# Patient Record
Sex: MALE | Race: BLACK OR AFRICAN AMERICAN | NOT HISPANIC OR LATINO | Employment: OTHER | URBAN - METROPOLITAN AREA
[De-identification: names, ages, dates, MRNs, and addresses within clinical notes are randomized per-mention and may not be internally consistent; named-entity substitution may affect disease eponyms.]

---

## 2020-03-07 PROBLEM — F14.10 COCAINE ABUSE: Status: ACTIVE | Noted: 2020-03-07

## 2020-03-07 PROBLEM — R74.8 ELEVATED CPK: Status: ACTIVE | Noted: 2020-03-07

## 2020-03-07 PROBLEM — R07.89 ATYPICAL CHEST PAIN: Status: ACTIVE | Noted: 2020-03-07

## 2020-03-07 PROBLEM — N17.9 AKI (ACUTE KIDNEY INJURY): Status: ACTIVE | Noted: 2020-03-07

## 2020-03-07 PROBLEM — D69.6 THROMBOCYTOPENIA, UNSPECIFIED: Status: ACTIVE | Noted: 2020-03-07

## 2020-03-07 PROBLEM — J10.1 INFLUENZA A: Status: ACTIVE | Noted: 2020-03-07

## 2020-03-07 PROBLEM — R42 DIZZINESS: Status: ACTIVE | Noted: 2020-03-07

## 2020-03-07 PROBLEM — B00.9 HERPESVIRAL INFECTION: Status: ACTIVE | Noted: 2020-03-07

## 2020-03-07 PROBLEM — E78.00 PURE HYPERCHOLESTEROLEMIA: Status: ACTIVE | Noted: 2020-03-07

## 2020-03-07 PROBLEM — R74.8 ABNORMAL LIVER ENZYMES: Status: ACTIVE | Noted: 2020-03-07

## 2020-03-07 PROBLEM — R53.1 WEAKNESS: Status: ACTIVE | Noted: 2020-03-07

## 2020-03-07 PROBLEM — Z91.038 ALLERGY TO INSECTS AND ARACHNIDS: Status: ACTIVE | Noted: 2020-03-07

## 2020-03-07 PROBLEM — B18.2 CHRONIC VIRAL HEPATITIS C: Status: ACTIVE | Noted: 2020-03-07

## 2020-03-07 PROBLEM — K75.9 HEPATITIS: Status: ACTIVE | Noted: 2020-03-07

## 2020-03-07 PROBLEM — F32.A DEPRESSION: Status: ACTIVE | Noted: 2020-03-07

## 2020-03-08 PROBLEM — R07.9 CHEST PAIN: Status: ACTIVE | Noted: 2020-03-07

## 2020-03-08 PROBLEM — R50.9 FEVER: Status: ACTIVE | Noted: 2020-03-08

## 2020-03-10 PROBLEM — M62.82 RHABDOMYOLYSIS: Status: ACTIVE | Noted: 2020-03-10

## 2020-03-11 PROBLEM — N17.9 AKI (ACUTE KIDNEY INJURY): Status: RESOLVED | Noted: 2020-03-07 | Resolved: 2020-03-11

## 2020-03-11 PROBLEM — R42 DIZZINESS: Status: RESOLVED | Noted: 2020-03-07 | Resolved: 2020-03-11

## 2020-03-11 PROBLEM — R07.9 CHEST PAIN: Status: RESOLVED | Noted: 2020-03-07 | Resolved: 2020-03-11

## 2020-03-11 PROBLEM — M62.82 RHABDOMYOLYSIS: Status: RESOLVED | Noted: 2020-03-10 | Resolved: 2020-03-11

## 2020-03-11 PROBLEM — R50.9 FEVER: Status: RESOLVED | Noted: 2020-03-08 | Resolved: 2020-03-11

## 2020-03-11 PROBLEM — R07.9 CHEST PAIN: Status: ACTIVE | Noted: 2020-03-11

## 2020-09-15 PROBLEM — D69.6 THROMBOCYTOPENIA: Chronic | Status: ACTIVE | Noted: 2020-09-15

## 2020-09-15 PROBLEM — D69.6 THROMBOCYTOPENIA: Status: ACTIVE | Noted: 2020-09-15

## 2020-09-15 PROBLEM — U07.1 RESPIRATORY TRACT INFECTION DUE TO COVID-19 VIRUS: Status: ACTIVE | Noted: 2020-09-15

## 2020-09-15 PROBLEM — J98.8 RESPIRATORY TRACT INFECTION DUE TO COVID-19 VIRUS: Status: ACTIVE | Noted: 2020-09-15

## 2020-09-15 PROBLEM — E87.20 METABOLIC ACIDOSIS: Status: ACTIVE | Noted: 2020-09-15

## 2020-09-15 PROBLEM — J12.82 PNEUMONIA DUE TO COVID-19 VIRUS: Status: ACTIVE | Noted: 2020-09-15

## 2020-09-15 PROBLEM — E87.1 HYPONATREMIA: Status: ACTIVE | Noted: 2020-09-15

## 2020-09-15 PROBLEM — J96.01 ACUTE RESPIRATORY FAILURE WITH HYPOXIA: Status: ACTIVE | Noted: 2020-09-15

## 2020-09-16 PROBLEM — E55.9 VITAMIN D INSUFFICIENCY: Status: ACTIVE | Noted: 2020-09-16

## 2020-09-18 DIAGNOSIS — U07.1 COVID-19 VIRUS DETECTED: ICD-10-CM

## 2020-09-19 PROBLEM — K72.91 HEPATIC COMA/ENCEPHALOPATHY: Status: RESOLVED | Noted: 2020-09-19 | Resolved: 2020-09-19

## 2020-09-19 PROBLEM — U07.1 COVID-19: Status: ACTIVE | Noted: 2020-09-19

## 2020-09-19 PROBLEM — E11.9 TYPE 2 DIABETES MELLITUS WITHOUT COMPLICATION, WITHOUT LONG-TERM CURRENT USE OF INSULIN: Status: ACTIVE | Noted: 2020-09-19

## 2020-09-19 PROBLEM — K72.91 HEPATIC COMA/ENCEPHALOPATHY: Status: ACTIVE | Noted: 2020-09-19

## 2020-09-20 PROBLEM — Z71.89 GRIEF COUNSELING: Status: ACTIVE | Noted: 2020-09-20

## 2020-09-21 PROBLEM — U07.1 PNEUMONIA DUE TO COVID-19 VIRUS: Status: RESOLVED | Noted: 2020-09-15 | Resolved: 2020-09-21

## 2020-09-21 PROBLEM — Z71.89 GRIEF COUNSELING: Status: RESOLVED | Noted: 2020-09-20 | Resolved: 2020-09-21

## 2020-09-21 PROBLEM — J12.82 PNEUMONIA DUE TO COVID-19 VIRUS: Status: RESOLVED | Noted: 2020-09-15 | Resolved: 2020-09-21

## 2021-06-06 PROBLEM — R07.9 CHEST PAIN: Status: ACTIVE | Noted: 2021-06-06

## 2021-06-06 PROBLEM — F10.10 ALCOHOL ABUSE: Status: ACTIVE | Noted: 2021-06-06

## 2021-07-10 PROBLEM — N17.9 AKI (ACUTE KIDNEY INJURY): Status: ACTIVE | Noted: 2021-07-10

## 2021-07-10 PROBLEM — R07.89 ATYPICAL CHEST PAIN: Status: ACTIVE | Noted: 2021-06-06

## 2021-07-13 PROBLEM — F14.129 COCAINE ABUSE WITH INTOXICATION: Status: ACTIVE | Noted: 2020-03-07

## 2021-07-13 PROBLEM — M10.9 GOUT: Status: ACTIVE | Noted: 2021-07-13

## 2021-07-13 PROBLEM — N17.9 AKI (ACUTE KIDNEY INJURY): Status: RESOLVED | Noted: 2021-07-10 | Resolved: 2021-07-13

## 2021-07-15 PROBLEM — R74.8 ELEVATED CPK: Status: RESOLVED | Noted: 2020-03-07 | Resolved: 2021-07-15

## 2021-07-15 PROBLEM — R07.89 ATYPICAL CHEST PAIN: Status: RESOLVED | Noted: 2021-06-06 | Resolved: 2021-07-15

## 2021-07-15 PROBLEM — E11.9 TYPE 2 DIABETES MELLITUS WITHOUT COMPLICATION, WITHOUT LONG-TERM CURRENT USE OF INSULIN: Status: RESOLVED | Noted: 2020-09-19 | Resolved: 2021-07-15

## 2022-01-31 PROBLEM — R53.1 WEAKNESS: Status: RESOLVED | Noted: 2020-03-07 | Resolved: 2022-01-31

## 2022-01-31 PROBLEM — E55.9 VITAMIN D INSUFFICIENCY: Chronic | Status: ACTIVE | Noted: 2020-09-16

## 2022-01-31 PROBLEM — B18.2 CHRONIC VIRAL HEPATITIS C: Chronic | Status: ACTIVE | Noted: 2020-03-07

## 2022-01-31 PROBLEM — J10.1 INFLUENZA A: Status: RESOLVED | Noted: 2020-03-07 | Resolved: 2022-01-31

## 2022-01-31 PROBLEM — U07.1 COVID-19: Status: RESOLVED | Noted: 2020-09-19 | Resolved: 2022-01-31

## 2022-01-31 PROBLEM — J96.01 ACUTE RESPIRATORY FAILURE WITH HYPOXIA: Status: RESOLVED | Noted: 2020-09-15 | Resolved: 2022-01-31

## 2022-01-31 PROBLEM — K75.9 HEPATITIS: Status: RESOLVED | Noted: 2020-03-07 | Resolved: 2022-01-31

## 2022-01-31 PROBLEM — E78.00 PURE HYPERCHOLESTEROLEMIA: Chronic | Status: ACTIVE | Noted: 2020-03-07

## 2022-01-31 PROBLEM — B00.9 HERPESVIRAL INFECTION: Chronic | Status: ACTIVE | Noted: 2020-03-07

## 2022-01-31 PROBLEM — Z91.038 ALLERGY TO INSECTS AND ARACHNIDS: Status: RESOLVED | Noted: 2020-03-07 | Resolved: 2022-01-31

## 2022-01-31 PROBLEM — M10.9 GOUT: Chronic | Status: ACTIVE | Noted: 2021-07-13

## 2022-01-31 PROBLEM — E87.20 METABOLIC ACIDOSIS: Status: RESOLVED | Noted: 2020-09-15 | Resolved: 2022-01-31

## 2022-01-31 PROBLEM — F14.129 COCAINE ABUSE WITH INTOXICATION: Status: RESOLVED | Noted: 2020-03-07 | Resolved: 2022-01-31

## 2022-01-31 PROBLEM — F32.A DEPRESSION: Chronic | Status: ACTIVE | Noted: 2020-03-07

## 2022-01-31 PROBLEM — E87.1 HYPONATREMIA: Status: RESOLVED | Noted: 2020-09-15 | Resolved: 2022-01-31

## 2022-01-31 PROBLEM — F10.10 ALCOHOL ABUSE: Chronic | Status: ACTIVE | Noted: 2021-06-06

## 2022-02-02 PROBLEM — Z72.0 TOBACCO ABUSE: Status: ACTIVE | Noted: 2022-02-02

## 2022-02-04 PROBLEM — T24.219A BLISTERS WITH EPIDERMAL LOSS DUE TO BURN (SECOND DEGREE) OF THIGH (ANY PART): Status: ACTIVE | Noted: 2022-02-04

## 2022-02-05 PROBLEM — R07.9 CHEST PAIN: Status: RESOLVED | Noted: 2020-03-07 | Resolved: 2022-02-05

## 2022-07-16 PROBLEM — F17.210 CIGARETTE NICOTINE DEPENDENCE WITHOUT COMPLICATION: Status: ACTIVE | Noted: 2022-02-02

## 2022-07-16 PROBLEM — Z79.4 TYPE 2 DIABETES MELLITUS WITHOUT COMPLICATION, WITH LONG-TERM CURRENT USE OF INSULIN: Status: ACTIVE | Noted: 2020-09-19

## 2022-07-16 PROBLEM — F10.20 ALCOHOL USE DISORDER, MODERATE, DEPENDENCE: Status: ACTIVE | Noted: 2022-07-16

## 2022-07-16 PROBLEM — F19.94 SUBSTANCE INDUCED MOOD DISORDER: Status: ACTIVE | Noted: 2022-07-16

## 2022-07-16 PROBLEM — F32.9 MAJOR DEPRESSIVE DISORDER: Status: ACTIVE | Noted: 2020-03-07

## 2022-07-16 PROBLEM — F14.20 COCAINE USE DISORDER, SEVERE, DEPENDENCE: Status: ACTIVE | Noted: 2022-07-16

## 2022-07-16 PROBLEM — F10.20 ALCOHOL USE DISORDER, SEVERE, DEPENDENCE: Status: ACTIVE | Noted: 2022-07-16

## 2022-07-18 PROBLEM — F33.2 SEVERE EPISODE OF RECURRENT MAJOR DEPRESSIVE DISORDER, WITHOUT PSYCHOTIC FEATURES: Status: ACTIVE | Noted: 2022-07-18

## 2022-07-20 PROBLEM — I95.2 HYPOTENSION DUE TO DRUGS: Status: ACTIVE | Noted: 2022-07-20

## 2022-08-06 PROBLEM — F14.10 COCAINE USE DISORDER: Status: ACTIVE | Noted: 2022-08-06

## 2022-10-03 PROBLEM — R06.02 SHORTNESS OF BREATH: Status: RESOLVED | Noted: 2022-10-03 | Resolved: 2022-10-03

## 2022-10-03 PROBLEM — R45.851 DEPRESSION WITH SUICIDAL IDEATION: Status: ACTIVE | Noted: 2020-03-07

## 2022-10-03 PROBLEM — R06.02 SHORTNESS OF BREATH: Status: ACTIVE | Noted: 2022-10-03

## 2022-10-03 PROBLEM — E11.9 TYPE 2 DIABETES MELLITUS WITHOUT COMPLICATION, WITHOUT LONG-TERM CURRENT USE OF INSULIN: Status: RESOLVED | Noted: 2020-09-19 | Resolved: 2022-10-03

## 2022-10-03 PROBLEM — R79.89 ELEVATED LFTS: Status: ACTIVE | Noted: 2022-10-03

## 2022-10-03 PROBLEM — Z79.4 TYPE 2 DIABETES MELLITUS WITHOUT COMPLICATION, WITH LONG-TERM CURRENT USE OF INSULIN: Status: RESOLVED | Noted: 2020-09-19 | Resolved: 2022-10-03

## 2022-10-03 PROBLEM — R74.01 TRANSAMINITIS: Status: ACTIVE | Noted: 2022-10-03

## 2022-10-03 PROBLEM — E88.09 HYPOALBUMINEMIA: Status: ACTIVE | Noted: 2022-10-03

## 2022-10-05 PROBLEM — F33.3 SEVERE EPISODE OF RECURRENT MAJOR DEPRESSIVE DISORDER, WITH PSYCHOTIC FEATURES: Status: ACTIVE | Noted: 2022-10-05

## 2023-03-08 ENCOUNTER — PATIENT OUTREACH (OUTPATIENT)
Dept: ADMINISTRATIVE | Facility: HOSPITAL | Age: 63
End: 2023-03-08

## 2023-05-22 PROBLEM — Z72.0 TOBACCO ABUSE: Status: ACTIVE | Noted: 2023-05-22

## 2023-05-23 PROBLEM — T30.0 BURN INJURY: Status: ACTIVE | Noted: 2023-05-23

## 2023-06-18 PROBLEM — N49.2 SCROTAL INFECTION: Status: ACTIVE | Noted: 2023-06-18

## 2023-06-19 PROBLEM — N49.2 SCROTAL INFECTION: Status: ACTIVE | Noted: 2023-06-19

## 2023-06-19 PROBLEM — L30.4 INTERTRIGO: Status: ACTIVE | Noted: 2023-06-18

## 2023-09-30 PROBLEM — M25.551 ACUTE RIGHT HIP PAIN: Status: ACTIVE | Noted: 2023-09-30

## 2023-09-30 PROBLEM — M25.552 ACUTE HIP PAIN, LEFT: Status: ACTIVE | Noted: 2023-09-30

## 2023-10-02 PROBLEM — R79.89 ABNORMAL LFTS: Status: RESOLVED | Noted: 2022-10-03 | Resolved: 2023-10-02

## 2023-10-04 PROBLEM — R45.851 SUICIDAL IDEATION: Status: ACTIVE | Noted: 2023-10-04

## 2023-10-04 PROBLEM — Z00.8 MEDICAL CLEARANCE FOR PSYCHIATRIC ADMISSION: Status: ACTIVE | Noted: 2023-10-04

## 2023-10-04 PROBLEM — I95.2 HYPOTENSION DUE TO DRUGS: Status: RESOLVED | Noted: 2022-07-20 | Resolved: 2023-10-04

## 2023-10-21 PROBLEM — R73.03 PREDIABETES: Status: ACTIVE | Noted: 2023-10-21

## 2023-12-29 PROBLEM — F33.1 MODERATE EPISODE OF RECURRENT MAJOR DEPRESSIVE DISORDER: Status: ACTIVE | Noted: 2023-12-29

## 2023-12-30 PROBLEM — F43.20 GRIEF REACTION: Status: ACTIVE | Noted: 2023-12-30

## 2023-12-30 PROBLEM — F43.21 GRIEF REACTION: Status: ACTIVE | Noted: 2023-12-30

## 2024-01-08 PROBLEM — Z00.8 MEDICAL CLEARANCE FOR PSYCHIATRIC ADMISSION: Status: RESOLVED | Noted: 2023-10-04 | Resolved: 2024-01-08

## 2024-04-27 PROBLEM — R45.851 DEPRESSION WITH SUICIDAL IDEATION: Chronic | Status: ACTIVE | Noted: 2020-03-07

## 2024-04-27 PROBLEM — Z72.0 TOBACCO ABUSE: Chronic | Status: ACTIVE | Noted: 2023-05-22

## 2024-04-27 PROBLEM — R45.851 DEPRESSION WITH SUICIDAL IDEATION: Chronic | Status: RESOLVED | Noted: 2020-03-07 | Resolved: 2024-04-27

## 2024-04-27 PROBLEM — T24.219A BLISTERS WITH EPIDERMAL LOSS DUE TO BURN (SECOND DEGREE) OF THIGH (ANY PART): Status: RESOLVED | Noted: 2022-02-04 | Resolved: 2024-04-27

## 2024-04-27 PROBLEM — R45.851 SUICIDAL IDEATION: Status: RESOLVED | Noted: 2023-10-04 | Resolved: 2024-04-27

## 2024-04-27 PROBLEM — F19.94 SUBSTANCE INDUCED MOOD DISORDER: Chronic | Status: ACTIVE | Noted: 2022-07-16

## 2024-04-27 PROBLEM — F10.20 ALCOHOL USE DISORDER, MODERATE, DEPENDENCE: Chronic | Status: ACTIVE | Noted: 2022-07-16

## 2024-04-27 PROBLEM — F14.20 COCAINE USE DISORDER, SEVERE, DEPENDENCE: Chronic | Status: ACTIVE | Noted: 2022-07-16

## 2024-04-27 PROBLEM — F10.10 ALCOHOL ABUSE: Status: RESOLVED | Noted: 2021-06-06 | Resolved: 2024-04-27

## 2024-04-27 PROBLEM — F33.3 SEVERE EPISODE OF RECURRENT MAJOR DEPRESSIVE DISORDER, WITH PSYCHOTIC FEATURES: Status: RESOLVED | Noted: 2022-10-05 | Resolved: 2024-04-27

## 2024-04-27 PROBLEM — F32.A DEPRESSION WITH SUICIDAL IDEATION: Chronic | Status: RESOLVED | Noted: 2020-03-07 | Resolved: 2024-04-27

## 2024-04-27 PROBLEM — F33.3 SEVERE EPISODE OF RECURRENT MAJOR DEPRESSIVE DISORDER, WITH PSYCHOTIC FEATURES: Chronic | Status: ACTIVE | Noted: 2022-10-05

## 2024-04-27 PROBLEM — F43.21 GRIEF REACTION: Status: RESOLVED | Noted: 2023-12-30 | Resolved: 2024-04-27

## 2024-04-27 PROBLEM — F43.20 GRIEF REACTION: Status: RESOLVED | Noted: 2023-12-30 | Resolved: 2024-04-27

## 2024-04-27 PROBLEM — F33.1 MODERATE EPISODE OF RECURRENT MAJOR DEPRESSIVE DISORDER: Chronic | Status: RESOLVED | Noted: 2023-12-29 | Resolved: 2024-04-27

## 2024-04-27 PROBLEM — L30.4 INTERTRIGO: Chronic | Status: ACTIVE | Noted: 2023-06-18

## 2024-04-27 PROBLEM — F33.2 SEVERE EPISODE OF RECURRENT MAJOR DEPRESSIVE DISORDER, WITHOUT PSYCHOTIC FEATURES: Chronic | Status: ACTIVE | Noted: 2022-07-18

## 2024-04-27 PROBLEM — F17.210 CIGARETTE NICOTINE DEPENDENCE WITHOUT COMPLICATION: Status: RESOLVED | Noted: 2022-02-02 | Resolved: 2024-04-27

## 2024-04-27 PROBLEM — E11.9 TYPE 2 DIABETES MELLITUS WITHOUT COMPLICATION, WITHOUT LONG-TERM CURRENT USE OF INSULIN: Chronic | Status: ACTIVE | Noted: 2020-09-19

## 2024-04-27 PROBLEM — F33.1 MODERATE EPISODE OF RECURRENT MAJOR DEPRESSIVE DISORDER: Chronic | Status: ACTIVE | Noted: 2023-12-29

## 2024-04-27 PROBLEM — F14.10 COCAINE USE DISORDER: Status: RESOLVED | Noted: 2022-08-06 | Resolved: 2024-04-27

## 2024-04-27 PROBLEM — M25.551 RIGHT HIP PAIN: Status: RESOLVED | Noted: 2023-09-30 | Resolved: 2024-04-27

## 2024-04-27 PROBLEM — R07.89 OTHER CHEST PAIN: Status: RESOLVED | Noted: 2020-03-07 | Resolved: 2024-04-27

## 2024-04-27 PROBLEM — N49.2 SCROTAL INFECTION: Status: RESOLVED | Noted: 2023-06-19 | Resolved: 2024-04-27

## 2024-04-27 PROBLEM — N17.9 AKI (ACUTE KIDNEY INJURY): Status: RESOLVED | Noted: 2021-07-10 | Resolved: 2024-04-27

## 2024-04-27 PROBLEM — R73.03 PREDIABETES: Status: RESOLVED | Noted: 2023-10-21 | Resolved: 2024-04-27

## 2024-04-27 PROBLEM — T30.0 BURN INJURY: Status: RESOLVED | Noted: 2023-05-23 | Resolved: 2024-04-27

## 2024-04-29 ENCOUNTER — PATIENT OUTREACH (OUTPATIENT)
Dept: ADMINISTRATIVE | Facility: HOSPITAL | Age: 64
End: 2024-04-29

## 2024-04-29 DIAGNOSIS — E11.9 TYPE 2 DIABETES MELLITUS WITHOUT COMPLICATION, WITHOUT LONG-TERM CURRENT USE OF INSULIN: Primary | Chronic | ICD-10-CM

## 2024-07-24 ENCOUNTER — HOSPITAL ENCOUNTER (EMERGENCY)
Facility: HOSPITAL | Age: 64
Discharge: HOME | End: 2024-07-24
Attending: STUDENT IN AN ORGANIZED HEALTH CARE EDUCATION/TRAINING PROGRAM
Payer: MEDICAID

## 2024-07-24 VITALS
BODY MASS INDEX: 31.84 KG/M2 | WEIGHT: 215 LBS | SYSTOLIC BLOOD PRESSURE: 168 MMHG | HEART RATE: 63 BPM | RESPIRATION RATE: 18 BRPM | DIASTOLIC BLOOD PRESSURE: 78 MMHG | OXYGEN SATURATION: 98 % | HEIGHT: 69 IN | TEMPERATURE: 98 F

## 2024-07-24 DIAGNOSIS — R73.9 HYPERGLYCEMIA: ICD-10-CM

## 2024-07-24 DIAGNOSIS — R06.02 SHORTNESS OF BREATH: Primary | ICD-10-CM

## 2024-07-24 LAB
ALBUMIN SERPL-MCNC: 3.7 G/DL (ref 3.4–4.8)
ALBUMIN/GLOB SERPL: 0.9 RATIO (ref 1.1–2)
ALP SERPL-CCNC: 181 UNIT/L (ref 40–150)
ALT SERPL-CCNC: 38 UNIT/L (ref 0–55)
ANION GAP SERPL CALC-SCNC: 11 MEQ/L
AST SERPL-CCNC: 29 UNIT/L (ref 5–34)
BASOPHILS # BLD AUTO: 0.04 X10(3)/MCL
BASOPHILS NFR BLD AUTO: 0.4 %
BILIRUB SERPL-MCNC: 0.3 MG/DL
BNP BLD-MCNC: 12.2 PG/ML
BUN SERPL-MCNC: 23.3 MG/DL (ref 8.4–25.7)
CALCIUM SERPL-MCNC: 9.6 MG/DL (ref 8.8–10)
CHLORIDE SERPL-SCNC: 101 MMOL/L (ref 98–107)
CO2 SERPL-SCNC: 23 MMOL/L (ref 23–31)
CREAT SERPL-MCNC: 1.36 MG/DL (ref 0.73–1.18)
CREAT/UREA NIT SERPL: 17
EOSINOPHIL # BLD AUTO: 0.27 X10(3)/MCL (ref 0–0.9)
EOSINOPHIL NFR BLD AUTO: 2.9 %
ERYTHROCYTE [DISTWIDTH] IN BLOOD BY AUTOMATED COUNT: 18.2 % (ref 11.5–17)
GFR SERPLBLD CREATININE-BSD FMLA CKD-EPI: 58 ML/MIN/1.73/M2
GLOBULIN SER-MCNC: 4.2 GM/DL (ref 2.4–3.5)
GLUCOSE SERPL-MCNC: 481 MG/DL (ref 82–115)
HCT VFR BLD AUTO: 38.6 % (ref 42–52)
HGB BLD-MCNC: 12.6 G/DL (ref 14–18)
HOLD SPECIMEN: NORMAL
IMM GRANULOCYTES # BLD AUTO: 0.03 X10(3)/MCL (ref 0–0.04)
IMM GRANULOCYTES NFR BLD AUTO: 0.3 %
LYMPHOCYTES # BLD AUTO: 2.46 X10(3)/MCL (ref 0.6–4.6)
LYMPHOCYTES NFR BLD AUTO: 26.2 %
MAGNESIUM SERPL-MCNC: 2.1 MG/DL (ref 1.6–2.6)
MCH RBC QN AUTO: 24.3 PG (ref 27–31)
MCHC RBC AUTO-ENTMCNC: 32.6 G/DL (ref 33–36)
MCV RBC AUTO: 74.5 FL (ref 80–94)
MONOCYTES # BLD AUTO: 0.93 X10(3)/MCL (ref 0.1–1.3)
MONOCYTES NFR BLD AUTO: 9.9 %
NEUTROPHILS # BLD AUTO: 5.65 X10(3)/MCL (ref 2.1–9.2)
NEUTROPHILS NFR BLD AUTO: 60.3 %
NRBC BLD AUTO-RTO: 0 %
PLATELET # BLD AUTO: 169 X10(3)/MCL (ref 130–400)
PLATELETS.RETICULATED NFR BLD AUTO: 8.2 % (ref 0.9–11.2)
PMV BLD AUTO: ABNORMAL FL
POCT GLUCOSE: 372 MG/DL (ref 70–110)
POTASSIUM SERPL-SCNC: 4.8 MMOL/L (ref 3.5–5.1)
PROT SERPL-MCNC: 7.9 GM/DL (ref 5.8–7.6)
RBC # BLD AUTO: 5.18 X10(6)/MCL (ref 4.7–6.1)
SODIUM SERPL-SCNC: 135 MMOL/L (ref 136–145)
TROPONIN I SERPL-MCNC: <0.01 NG/ML (ref 0–0.04)
WBC # BLD AUTO: 9.38 X10(3)/MCL (ref 4.5–11.5)

## 2024-07-24 PROCEDURE — 83880 ASSAY OF NATRIURETIC PEPTIDE: CPT | Performed by: STUDENT IN AN ORGANIZED HEALTH CARE EDUCATION/TRAINING PROGRAM

## 2024-07-24 PROCEDURE — 25000003 PHARM REV CODE 250: Performed by: STUDENT IN AN ORGANIZED HEALTH CARE EDUCATION/TRAINING PROGRAM

## 2024-07-24 PROCEDURE — 99285 EMERGENCY DEPT VISIT HI MDM: CPT | Mod: 25

## 2024-07-24 PROCEDURE — 82962 GLUCOSE BLOOD TEST: CPT

## 2024-07-24 PROCEDURE — 93005 ELECTROCARDIOGRAM TRACING: CPT

## 2024-07-24 PROCEDURE — 96360 HYDRATION IV INFUSION INIT: CPT

## 2024-07-24 PROCEDURE — 84484 ASSAY OF TROPONIN QUANT: CPT | Performed by: STUDENT IN AN ORGANIZED HEALTH CARE EDUCATION/TRAINING PROGRAM

## 2024-07-24 PROCEDURE — 85025 COMPLETE CBC W/AUTO DIFF WBC: CPT | Performed by: STUDENT IN AN ORGANIZED HEALTH CARE EDUCATION/TRAINING PROGRAM

## 2024-07-24 PROCEDURE — 83735 ASSAY OF MAGNESIUM: CPT | Performed by: STUDENT IN AN ORGANIZED HEALTH CARE EDUCATION/TRAINING PROGRAM

## 2024-07-24 PROCEDURE — 80053 COMPREHEN METABOLIC PANEL: CPT | Performed by: STUDENT IN AN ORGANIZED HEALTH CARE EDUCATION/TRAINING PROGRAM

## 2024-07-24 RX ADMIN — SODIUM CHLORIDE 1000 ML: 9 INJECTION, SOLUTION INTRAVENOUS at 03:07

## 2024-07-24 NOTE — ED PROVIDER NOTES
Encounter Date: 7/24/2024       History     Chief Complaint   Patient presents with    Shortness of Breath     SOB since this morning. Denies pain. No distress. Sent from [facility] substance abuse program.      Patient presents to the emergency department due to shortness of breath.  He was states he was symptoms started this morning.  Intermittent sharp anterior chest pain but nothing consistent.  Denies any cough or congestion.  He was given a breathing treatment at the substance abuse treatment facility at which he is currently inpatient with significant improvement.  No fevers.  No pain or swelling in his legs.    The history is provided by the patient.     Review of patient's allergies indicates:  No Known Allergies  Past Medical History:   Diagnosis Date    Acute respiratory failure with hypoxia 09/15/2020    Alcohol abuse 06/06/2021    Alcohol use disorder, moderate, dependence 07/16/2022    Arthritis     Back pain     Chronic viral hepatitis C 03/07/2020    Cocaine abuse     Cocaine abuse with intoxication 03/07/2020    Cocaine use disorder, severe, dependence 07/16/2022    COVID-19 09/19/2020    Depression 03/07/2020    Depression with suicidal ideation 03/07/2020    Diabetes mellitus     Essential hypertension 01/14/2016    Gout 07/13/2021    Hearing deficit     Hepatitis C     Herpesviral infection 03/07/2020    History of psychiatric hospitalization     HTN (hypertension)     Hx of psychiatric care     Pure hypercholesterolemia 03/07/2020    Pure hypercholesterolemia 03/07/2020    Scrotal infection 06/19/2023    Severe episode of recurrent major depressive disorder, without psychotic features 07/18/2022    Substance induced mood disorder 07/16/2022    Suicidal ideation 10/04/2023    Type 2 diabetes mellitus without complication, without long-term current use of insulin 09/19/2020    Uncontrolled type 2 diabetes mellitus with hyperglycemia     Vitamin D insufficiency 09/16/2020     Past Surgical  "History:   Procedure Laterality Date    ARTHROCENTESIS OF HIP JOINT Right 9/30/2023    Procedure: RIGHT HIP ASPIRATION, POS I&D;  Surgeon: Susan Houston MD;  Location: [facility] MAIN OR;  Service: Orthopedics;  Laterality: Right;    NO PAST SURGERIES       Family History   Problem Relation Name Age of Onset    No Known Problems Mother      Heart disease Father      Heart disease Brother       Social History     Tobacco Use    Smoking status: Every Day     Current packs/day: 0.25     Average packs/day: 0.3 packs/day for 12.0 years (3.0 ttl pk-yrs)     Types: Cigarettes    Smokeless tobacco: Never   Substance Use Topics    Alcohol use: Yes     Comment: Patient drinks 2-3 times/week. Each time he drinks 3-4 cans of beer.    Drug use: Yes     Types: Cocaine, "Crack" cocaine     Review of Systems   Constitutional:  Negative for chills and fever.   HENT:  Negative for congestion and sore throat.    Respiratory:  Positive for shortness of breath. Negative for cough.    Cardiovascular:  Positive for chest pain. Negative for palpitations.   Gastrointestinal:  Negative for abdominal pain and nausea.   Genitourinary:  Negative for dysuria and hematuria.   Musculoskeletal:  Negative for arthralgias and myalgias.   Neurological:  Negative for dizziness and weakness.       Physical Exam     Initial Vitals [07/24/24 1414]   BP Pulse Resp Temp SpO2   (!) 145/84 68 18 97.8 °F (36.6 °C) 97 %      MAP       --         Physical Exam    Nursing note and vitals reviewed.  Constitutional: He appears well-developed and well-nourished.   HENT:   Head: Normocephalic and atraumatic.   Eyes: Conjunctivae are normal. Pupils are equal, round, and reactive to light.   Neck: Neck supple.   Normal range of motion.  Cardiovascular:  Normal rate, regular rhythm and normal heart sounds.           Pulmonary/Chest: Breath sounds normal. No respiratory distress. He has no wheezes. He has no rales.   Abdominal: Abdomen is soft. There is no abdominal " tenderness.   Musculoskeletal:         General: No edema. Normal range of motion.      Cervical back: Normal range of motion and neck supple.     Neurological: He is alert and oriented to person, place, and time.   Skin: Skin is warm and dry.         ED Course   Procedures  Labs Reviewed   COMPREHENSIVE METABOLIC PANEL - Abnormal       Result Value    Sodium 135 (*)     Potassium 4.8      Chloride 101      CO2 23      Glucose 481 (*)     Blood Urea Nitrogen 23.3      Creatinine 1.36 (*)     Calcium 9.6      Protein Total 7.9 (*)     Albumin 3.7      Globulin 4.2 (*)     Albumin/Globulin Ratio 0.9 (*)     Bilirubin Total 0.3       (*)     ALT 38      AST 29      eGFR 58      Anion Gap 11.0      BUN/Creatinine Ratio 17     CBC WITH DIFFERENTIAL - Abnormal    WBC 9.38      RBC 5.18      Hgb 12.6 (*)     Hct 38.6 (*)     MCV 74.5 (*)     MCH 24.3 (*)     MCHC 32.6 (*)     RDW 18.2 (*)     Platelet 169      MPV        IPF 8.2      Neut % 60.3      Lymph % 26.2      Mono % 9.9      Eos % 2.9      Basophil % 0.4      Lymph # 2.46      Neut # 5.65      Mono # 0.93      Eos # 0.27      Baso # 0.04      IG# 0.03      IG% 0.3      NRBC% 0.0     POCT GLUCOSE - Abnormal    POCT Glucose 372 (*)    MAGNESIUM - Normal    Magnesium Level 2.10     TROPONIN I - Normal    Troponin-I <0.010     B-TYPE NATRIURETIC PEPTIDE - Normal    Natriuretic Peptide 12.2     CBC W/ AUTO DIFFERENTIAL    Narrative:     The following orders were created for panel order CBC auto differential.  Procedure                               Abnormality         Status                     ---------                               -----------         ------                     CBC with Differential[[id number]]       Abnormal            Final result                 Please view results for these tests on the individual orders.   EXTRA TUBES    Narrative:     The following orders were created for panel order EXTRA TUBES.  Procedure                                Abnormality         Status                     ---------                               -----------         ------                     Light Blue Top Hold[[id number]]                             Final result               Red Top Hold[[id number]]                                    Final result               Light Green Top Hold[[id number]]                            Final result               Lavender Top Hold[[id number]]                               Final result               Gold Top Hold[[id number]]                                   Final result               Pink Top Hold[[id number]]                                   Final result                 Please view results for these tests on the individual orders.   LIGHT BLUE TOP HOLD    Extra Tube Hold for add-ons.     RED TOP HOLD    Extra Tube Hold for add-ons.     LIGHT GREEN TOP HOLD    Extra Tube Hold for add-ons.     LAVENDER TOP HOLD    Extra Tube Hold for add-ons.     GOLD TOP HOLD    Extra Tube Hold for add-ons.     PINK TOP HOLD    Extra Tube Hold for add-ons.          ECG Results              EKG 12-lead (In process)        Collection Time Result Time QRS Duration [facility] QTC Calculation    07/24/24 14:26:34 07/24/24 14:37:32 86 425                     In process by Interface, Lab In [facility] (07/24/24 14:37:42)                   Narrative:    Test Reason : R06.02,    Vent. Rate : 059 BPM     Atrial Rate : 059 BPM     P-R Int : 210 ms          QRS Dur : 086 ms      QT Int : 430 ms       P-R-T Axes : 043 -07 017 degrees     QTc Int : 425 ms    Sinus bradycardia with 1st degree A-V block  Moderate voltage criteria for LVH, may be normal variant  Borderline Abnormal ECG  When compared with ECG of 12-JUN-2024 10:09,  No significant change was found    Referred By: [email or web address]   SELF           Confirmed By:                                   Imaging Results              X-Ray Chest AP Portable (Final result)  Result time 07/24/24 16:31:47      Final result by Nnamdi  Jaquan WILKERSON MD (07/24/24 16:31:47)                   Impression:      No acute findings.      Electronically signed by: Jaquan Coto  Date:    07/24/2024  Time:    16:31               Narrative:    EXAMINATION:  XR CHEST AP PORTABLE    CLINICAL HISTORY:  Shortness of breath;    COMPARISON:  12 June 2024    FINDINGS:  Frontal view of the chest was obtained. Heart is not significantly enlarged.  Grossly clear lungs.  No pneumothorax.                                       Medications   sodium chloride 0.9% bolus 1,000 mL 1,000 mL (0 mLs Intravenous Stopped 7/24/24 1651)     Medical Decision Making  Mildly hypertensive otherwise vital signs are stable.  EKGs without acute ischemic changes.  CBC, troponin reassuring.  Chest x-ray without acute approcess.  He was significantly hyperglycemic on CMP but no evidence of DKA.  After IV fluids glucose has been improved.  Otherwise workup reassuring.  Appropriate for discharge.     Amount and/or Complexity of Data Reviewed  Labs: ordered. Decision-making details documented in ED Course.  Radiology: ordered. Decision-making details documented in ED Course.  ECG/medicine tests: ordered and independent interpretation performed. Decision-making details documented in ED Course.      Additional MDM:   Differential Diagnosis:   Pneumonia, Asthma exacerbation, COPD exacerbation, Pericardial Effusion, Hear Failure, Pulmonary Emboli, Pleural effusion, malignancy, among others                                     Clinical Impression:  Final diagnoses:  [R06.02] Shortness of breath (Primary)  [R73.9] Hyperglycemia          ED Disposition Condition    Discharge Stable          ED Prescriptions    None       Follow-up Information       Follow up With Specialties Details Why Contact Info    [facility] Emergency Medicine Go to  If symptoms worsen [address] [facility] [place] [address] [phone]             Tyler Parker MD  07/25/24 1973

## 2024-07-25 LAB
OHS QRS DURATION: 86 MS
OHS QTC CALCULATION: 425 MS

## 2024-09-26 ENCOUNTER — OFFICE VISIT (OUTPATIENT)
Dept: URGENT CARE | Facility: CLINIC | Age: 64
End: 2024-09-26
Payer: MEDICAID

## 2024-09-26 VITALS
WEIGHT: 200 LBS | OXYGEN SATURATION: 100 % | BODY MASS INDEX: 29.62 KG/M2 | RESPIRATION RATE: 16 BRPM | HEART RATE: 62 BPM | DIASTOLIC BLOOD PRESSURE: 94 MMHG | TEMPERATURE: 98 F | HEIGHT: 69 IN | SYSTOLIC BLOOD PRESSURE: 156 MMHG

## 2024-09-26 DIAGNOSIS — K08.89 PAIN, DENTAL: Primary | ICD-10-CM

## 2024-09-26 PROCEDURE — 99203 OFFICE O/P NEW LOW 30 MIN: CPT | Mod: S$PBB,,, | Performed by: FAMILY MEDICINE

## 2024-09-26 PROCEDURE — 63600175 PHARM REV CODE 636 W HCPCS

## 2024-09-26 PROCEDURE — 99215 OFFICE O/P EST HI 40 MIN: CPT | Mod: PBBFAC | Performed by: FAMILY MEDICINE

## 2024-09-26 RX ORDER — ARIPIPRAZOLE 5 MG/1
5 TABLET ORAL
COMMUNITY
Start: 2024-07-12

## 2024-09-26 RX ORDER — AMLODIPINE BESYLATE 5 MG/1
5 TABLET ORAL
COMMUNITY
Start: 2024-04-23

## 2024-09-26 RX ORDER — AMOXICILLIN AND CLAVULANATE POTASSIUM 875; 125 MG/1; MG/1
1 TABLET, FILM COATED ORAL 2 TIMES DAILY
Qty: 20 TABLET | Refills: 0 | Status: SHIPPED | OUTPATIENT
Start: 2024-09-26 | End: 2024-10-06

## 2024-09-26 RX ORDER — KETOROLAC TROMETHAMINE 30 MG/ML
30 INJECTION, SOLUTION INTRAMUSCULAR; INTRAVENOUS
Status: COMPLETED | OUTPATIENT
Start: 2024-09-26 | End: 2024-09-26

## 2024-09-26 RX ORDER — SERTRALINE HYDROCHLORIDE 100 MG/1
100 TABLET, FILM COATED ORAL
COMMUNITY
Start: 2024-09-19

## 2024-09-26 RX ORDER — TRAZODONE HYDROCHLORIDE 150 MG/1
150 TABLET ORAL NIGHTLY
COMMUNITY
Start: 2024-07-12

## 2024-09-26 RX ADMIN — KETOROLAC TROMETHAMINE 30 MG: 30 INJECTION, SOLUTION INTRAMUSCULAR at 11:09

## 2024-09-26 NOTE — LETTER
September 26, 2024      [facility]  [facility] [facility] [address]  Phone: [phone]       Patient: Mak Thurman   YOB: 1960  Date of Visit: 09/26/2024    To Whom It May Concern:    Luba Thurman  was at [facility] on 09/26/2024. Please allow this patient to have a soft mechanical diet. If you have any questions or concerns, or if I can be of further assistance, please do not hesitate to contact me.    Sincerely,    UCHE Rubio MD

## 2024-09-26 NOTE — PROGRESS NOTES
"Subjective:       Patient ID: Mak Thurman is a 64 y.o. male.    Vitals:  height is 5' 9" (1.753 m) and weight is 90.7 kg (200 lb). His oral temperature is 98 °F (36.7 °C). His blood pressure is 156/94 (abnormal) and his pulse is 62. His respiration is 16 and oxygen saturation is 100%.     Chief Complaint: Dental Pain (X1 week gum abscess with pain and swelling)    Patient with several days of left upper molar pain and swelling, no drainage.  No sore throat or difficulty swallowing.  Denies neck pain or swelling, no swollen glands.  No fever.  Currently at [facility], on naltrexone.  No known history of CKD, creatinine was mildly elevated in July this year.  He is needing something for discomfort.    All other systems are negative    Chart reviewed    Objective:   Physical Exam  Oral exam  VITAL SIGNS:  Reviewed.      GENERAL:  In no apparent distress  HEAD:  No signs of head trauma  EYES:  Extraocular motions intact  NOSE: No sinus tenderness, small amount of swelling over left lateral maxilla.  MOUTH: Posterior pharynx is clear.  Scattered caries.  Gingival edema left upper molars, no purulence.  NECK:  No adenopathy, no JVD           Assessment:     1. Pain, dental            Plan:   Opioids not an options secondary to naltrexone.  Will give a low-dose Toradol IM to give him brief relief.  Begin Augmentin.  Encouraged him to see a dentist as soon as possible.  We discussed signs and symptoms that should prompt an ER evaluation.  He voiced understanding.      Pain, dental  -     amoxicillin-clavulanate 875-125mg (AUGMENTIN) 875-125 mg per tablet; Take 1 tablet by mouth 2 (two) times daily. for 10 days  Dispense: 20 tablet; Refill: 0  -     ketorolac injection 30 mg        Please note: This chart was completed via voice to text dictation. It may contain typographical/word recognition errors. If there are any questions, please contact the provider for final clarification.    "

## 2025-05-30 ENCOUNTER — PATIENT OUTREACH (OUTPATIENT)
Dept: ADMINISTRATIVE | Facility: HOSPITAL | Age: 65
End: 2025-05-30

## 2025-05-30 DIAGNOSIS — E11.9 TYPE 2 DIABETES MELLITUS WITHOUT COMPLICATION, WITHOUT LONG-TERM CURRENT USE OF INSULIN: Primary | Chronic | ICD-10-CM
